# Patient Record
Sex: MALE | Race: WHITE | NOT HISPANIC OR LATINO
[De-identification: names, ages, dates, MRNs, and addresses within clinical notes are randomized per-mention and may not be internally consistent; named-entity substitution may affect disease eponyms.]

---

## 2021-06-14 ENCOUNTER — TRANSCRIPTION ENCOUNTER (OUTPATIENT)
Age: 86
End: 2021-06-14

## 2021-06-14 DIAGNOSIS — E03.9 HYPOTHYROIDISM, UNSPECIFIED: ICD-10-CM

## 2021-06-14 DIAGNOSIS — T82.7XXA INFECTION AND INFLAMMATORY REACTION DUE TO OTHER CARDIAC AND VASCULAR DEVICES, IMPLANTS AND GRAFTS, INITIAL ENCOUNTER: ICD-10-CM

## 2021-06-14 PROBLEM — Z00.00 ENCOUNTER FOR PREVENTIVE HEALTH EXAMINATION: Status: ACTIVE | Noted: 2021-06-14

## 2021-06-14 RX ORDER — LEVOFLOXACIN 750 MG/1
750 TABLET, FILM COATED ORAL
Refills: 0 | Status: ACTIVE | COMMUNITY

## 2021-06-14 RX ORDER — CEPHALEXIN 500 MG/1
500 CAPSULE ORAL 3 TIMES DAILY
Qty: 30 | Refills: 0 | Status: ACTIVE | COMMUNITY

## 2021-06-14 RX ORDER — BACILLUS COAGULANS/INULIN 1B-250 MG
CAPSULE ORAL
Refills: 0 | Status: ACTIVE | COMMUNITY

## 2021-06-14 RX ORDER — VIT C/E/ZN/COPPR/LUTEIN/ZEAXAN 250MG-90MG
CAPSULE ORAL
Refills: 0 | Status: ACTIVE | COMMUNITY

## 2021-06-14 RX ORDER — GABAPENTIN 100 MG/1
100 CAPSULE ORAL TWICE DAILY
Qty: 60 | Refills: 0 | Status: ACTIVE | COMMUNITY

## 2021-06-14 RX ORDER — LORATADINE 10 MG
17 TABLET,DISINTEGRATING ORAL
Refills: 0 | Status: ACTIVE | COMMUNITY

## 2021-06-14 RX ORDER — IPRATROPIUM BROMIDE 21 UG/1
0.03 SPRAY NASAL
Refills: 0 | Status: ACTIVE | COMMUNITY

## 2021-06-14 RX ORDER — FAMOTIDINE 40 MG/1
40 TABLET, FILM COATED ORAL DAILY
Refills: 0 | Status: ACTIVE | COMMUNITY

## 2021-06-14 RX ORDER — CRANBERRY FRUIT EXTRACT 200 MG
CAPSULE ORAL
Refills: 0 | Status: ACTIVE | COMMUNITY

## 2021-06-14 RX ORDER — BUDESONIDE 32 UG/1
32 SPRAY, METERED NASAL
Refills: 0 | Status: ACTIVE | COMMUNITY

## 2021-06-14 RX ORDER — ALBUTEROL 90 MCG
90 AEROSOL (GRAM) INHALATION
Refills: 0 | Status: ACTIVE | COMMUNITY

## 2021-06-14 RX ORDER — TEMAZEPAM 7.5 MG/1
7.5 CAPSULE ORAL
Refills: 0 | Status: ACTIVE | COMMUNITY

## 2021-06-14 RX ORDER — POLYETHYLENE GLYCOL 400 AND PROPYLENE GLYCOL 4; 3 MG/ML; MG/ML
0.4-0.3 SOLUTION/ DROPS OPHTHALMIC
Refills: 0 | Status: ACTIVE | COMMUNITY

## 2021-06-14 RX ORDER — TAMSULOSIN HYDROCHLORIDE 0.4 MG/1
0.4 CAPSULE ORAL
Refills: 0 | Status: ACTIVE | COMMUNITY

## 2021-06-14 RX ORDER — LEVOTHYROXINE SODIUM 0.05 MG/1
50 TABLET ORAL DAILY
Refills: 0 | Status: ACTIVE | COMMUNITY

## 2021-06-14 RX ORDER — RIFAMPIN 300 MG/1
300 CAPSULE ORAL
Refills: 0 | Status: ACTIVE | COMMUNITY

## 2021-06-14 RX ORDER — TORSEMIDE 20 MG/1
20 TABLET ORAL TWICE DAILY
Refills: 0 | Status: ACTIVE | COMMUNITY

## 2021-06-15 ENCOUNTER — INPATIENT (INPATIENT)
Facility: HOSPITAL | Age: 86
LOS: 1 days | Discharge: HOME | DRG: 229 | End: 2021-06-17
Attending: INTERNAL MEDICINE | Admitting: INTERNAL MEDICINE
Payer: MEDICARE

## 2021-06-15 ENCOUNTER — TRANSCRIPTION ENCOUNTER (OUTPATIENT)
Age: 86
End: 2021-06-15

## 2021-06-15 VITALS
RESPIRATION RATE: 17 BRPM | HEIGHT: 71 IN | DIASTOLIC BLOOD PRESSURE: 83 MMHG | HEART RATE: 80 BPM | WEIGHT: 162.04 LBS | SYSTOLIC BLOOD PRESSURE: 130 MMHG | OXYGEN SATURATION: 98 % | TEMPERATURE: 97 F

## 2021-06-15 DIAGNOSIS — Z95.0 PRESENCE OF CARDIAC PACEMAKER: Chronic | ICD-10-CM

## 2021-06-15 DIAGNOSIS — T82.7XXA INFECTION AND INFLAMMATORY REACTION DUE TO OTHER CARDIAC AND VASCULAR DEVICES, IMPLANTS AND GRAFTS, INITIAL ENCOUNTER: ICD-10-CM

## 2021-06-15 DIAGNOSIS — Z98.890 OTHER SPECIFIED POSTPROCEDURAL STATES: Chronic | ICD-10-CM

## 2021-06-15 LAB
ALBUMIN SERPL ELPH-MCNC: 3.5 G/DL — SIGNIFICANT CHANGE UP (ref 3.3–5)
ALP SERPL-CCNC: 130 U/L — HIGH (ref 40–120)
ALT FLD-CCNC: 17 U/L — SIGNIFICANT CHANGE UP (ref 10–45)
ANION GAP SERPL CALC-SCNC: 16 MMOL/L — SIGNIFICANT CHANGE UP (ref 5–17)
APTT BLD: 29.6 SEC — SIGNIFICANT CHANGE UP (ref 27.5–35.5)
AST SERPL-CCNC: 28 U/L — SIGNIFICANT CHANGE UP (ref 10–40)
BILIRUB SERPL-MCNC: 1.4 MG/DL — HIGH (ref 0.2–1.2)
BLD GP AB SCN SERPL QL: NEGATIVE — SIGNIFICANT CHANGE UP
BUN SERPL-MCNC: 27 MG/DL — HIGH (ref 7–23)
CALCIUM SERPL-MCNC: 9.1 MG/DL — SIGNIFICANT CHANGE UP (ref 8.4–10.5)
CHLORIDE SERPL-SCNC: 96 MMOL/L — SIGNIFICANT CHANGE UP (ref 96–108)
CO2 SERPL-SCNC: 28 MMOL/L — SIGNIFICANT CHANGE UP (ref 22–31)
CREAT SERPL-MCNC: 1.42 MG/DL — HIGH (ref 0.5–1.3)
GLUCOSE SERPL-MCNC: 104 MG/DL — HIGH (ref 70–99)
HCT VFR BLD CALC: 38.6 % — LOW (ref 39–50)
HGB BLD-MCNC: 11.9 G/DL — LOW (ref 13–17)
INR BLD: 1.2 RATIO — HIGH (ref 0.88–1.16)
MCHC RBC-ENTMCNC: 27.8 PG — SIGNIFICANT CHANGE UP (ref 27–34)
MCHC RBC-ENTMCNC: 30.8 GM/DL — LOW (ref 32–36)
MCV RBC AUTO: 90.2 FL — SIGNIFICANT CHANGE UP (ref 80–100)
NRBC # BLD: 0 /100 WBCS — SIGNIFICANT CHANGE UP (ref 0–0)
PLATELET # BLD AUTO: 232 K/UL — SIGNIFICANT CHANGE UP (ref 150–400)
POTASSIUM SERPL-MCNC: 3.6 MMOL/L — SIGNIFICANT CHANGE UP (ref 3.5–5.3)
POTASSIUM SERPL-SCNC: 3.6 MMOL/L — SIGNIFICANT CHANGE UP (ref 3.5–5.3)
PROT SERPL-MCNC: 7.1 G/DL — SIGNIFICANT CHANGE UP (ref 6–8.3)
PROTHROM AB SERPL-ACNC: 14.3 SEC — HIGH (ref 10.6–13.6)
RBC # BLD: 4.28 M/UL — SIGNIFICANT CHANGE UP (ref 4.2–5.8)
RBC # FLD: 17.2 % — HIGH (ref 10.3–14.5)
RH IG SCN BLD-IMP: POSITIVE — SIGNIFICANT CHANGE UP
SODIUM SERPL-SCNC: 140 MMOL/L — SIGNIFICANT CHANGE UP (ref 135–145)
WBC # BLD: 7.17 K/UL — SIGNIFICANT CHANGE UP (ref 3.8–10.5)
WBC # FLD AUTO: 7.17 K/UL — SIGNIFICANT CHANGE UP (ref 3.8–10.5)

## 2021-06-15 PROCEDURE — 93010 ELECTROCARDIOGRAM REPORT: CPT | Mod: 76

## 2021-06-15 PROCEDURE — 33235 REMOVAL PACEMAKER ELECTRODE: CPT

## 2021-06-15 PROCEDURE — 33274 TCAT INSJ/RPL PERM LDLS PM: CPT | Mod: Q0

## 2021-06-15 PROCEDURE — 33233 REMOVAL OF PM GENERATOR: CPT

## 2021-06-15 RX ORDER — OXYCODONE AND ACETAMINOPHEN 5; 325 MG/1; MG/1
1 TABLET ORAL EVERY 6 HOURS
Refills: 0 | Status: DISCONTINUED | OUTPATIENT
Start: 2021-06-15 | End: 2021-06-17

## 2021-06-15 RX ORDER — GABAPENTIN 400 MG/1
2 CAPSULE ORAL
Qty: 0 | Refills: 0 | DISCHARGE

## 2021-06-15 RX ORDER — BUDESONIDE, MICRONIZED 100 %
1 POWDER (GRAM) MISCELLANEOUS
Qty: 0 | Refills: 0 | DISCHARGE

## 2021-06-15 RX ORDER — FAMOTIDINE 10 MG/ML
1 INJECTION INTRAVENOUS
Qty: 0 | Refills: 0 | DISCHARGE

## 2021-06-15 RX ORDER — LANOLIN ALCOHOL/MO/W.PET/CERES
3 CREAM (GRAM) TOPICAL AT BEDTIME
Refills: 0 | Status: DISCONTINUED | OUTPATIENT
Start: 2021-06-15 | End: 2021-06-17

## 2021-06-15 RX ORDER — FENTANYL CITRATE 50 UG/ML
12.5 INJECTION INTRAVENOUS
Refills: 0 | Status: DISCONTINUED | OUTPATIENT
Start: 2021-06-15 | End: 2021-06-15

## 2021-06-15 RX ORDER — SODIUM CHLORIDE 9 MG/ML
1000 INJECTION, SOLUTION INTRAVENOUS
Refills: 0 | Status: DISCONTINUED | OUTPATIENT
Start: 2021-06-15 | End: 2021-06-15

## 2021-06-15 RX ORDER — ACETAMINOPHEN 500 MG
650 TABLET ORAL EVERY 6 HOURS
Refills: 0 | Status: DISCONTINUED | OUTPATIENT
Start: 2021-06-15 | End: 2021-06-17

## 2021-06-15 RX ORDER — ALBUTEROL 90 UG/1
1 AEROSOL, METERED ORAL
Qty: 0 | Refills: 0 | DISCHARGE

## 2021-06-15 RX ORDER — CEFEPIME 1 G/1
1000 INJECTION, POWDER, FOR SOLUTION INTRAMUSCULAR; INTRAVENOUS ONCE
Refills: 0 | Status: COMPLETED | OUTPATIENT
Start: 2021-06-15 | End: 2021-06-15

## 2021-06-15 RX ORDER — LEVOTHYROXINE SODIUM 125 MCG
1 TABLET ORAL
Qty: 0 | Refills: 0 | DISCHARGE

## 2021-06-15 RX ORDER — SODIUM CHLORIDE 9 MG/ML
3 INJECTION INTRAMUSCULAR; INTRAVENOUS; SUBCUTANEOUS EVERY 8 HOURS
Refills: 0 | Status: DISCONTINUED | OUTPATIENT
Start: 2021-06-15 | End: 2021-06-17

## 2021-06-15 RX ORDER — VANCOMYCIN HCL 1 G
1000 VIAL (EA) INTRAVENOUS EVERY 24 HOURS
Refills: 0 | Status: DISCONTINUED | OUTPATIENT
Start: 2021-06-16 | End: 2021-06-16

## 2021-06-15 RX ORDER — ACETAMINOPHEN 500 MG
1000 TABLET ORAL ONCE
Refills: 0 | Status: COMPLETED | OUTPATIENT
Start: 2021-06-15 | End: 2021-06-15

## 2021-06-15 RX ORDER — TAMSULOSIN HYDROCHLORIDE 0.4 MG/1
1 CAPSULE ORAL
Qty: 0 | Refills: 0 | DISCHARGE

## 2021-06-15 RX ORDER — TEMAZEPAM 15 MG/1
1 CAPSULE ORAL
Qty: 0 | Refills: 0 | DISCHARGE

## 2021-06-15 RX ORDER — CEFEPIME 1 G/1
1000 INJECTION, POWDER, FOR SOLUTION INTRAMUSCULAR; INTRAVENOUS EVERY 12 HOURS
Refills: 0 | Status: DISCONTINUED | OUTPATIENT
Start: 2021-06-16 | End: 2021-06-16

## 2021-06-15 RX ORDER — FENTANYL CITRATE 50 UG/ML
25 INJECTION INTRAVENOUS
Refills: 0 | Status: DISCONTINUED | OUTPATIENT
Start: 2021-06-15 | End: 2021-06-15

## 2021-06-15 RX ADMIN — Medication 1000 MILLIGRAM(S): at 19:50

## 2021-06-15 RX ADMIN — FENTANYL CITRATE 25 MICROGRAM(S): 50 INJECTION INTRAVENOUS at 19:54

## 2021-06-15 RX ADMIN — Medication 650 MILLIGRAM(S): at 23:55

## 2021-06-15 RX ADMIN — SODIUM CHLORIDE 75 MILLILITER(S): 9 INJECTION, SOLUTION INTRAVENOUS at 20:02

## 2021-06-15 RX ADMIN — SODIUM CHLORIDE 3 MILLILITER(S): 9 INJECTION INTRAMUSCULAR; INTRAVENOUS; SUBCUTANEOUS at 14:44

## 2021-06-15 RX ADMIN — CEFEPIME 100 MILLIGRAM(S): 1 INJECTION, POWDER, FOR SOLUTION INTRAMUSCULAR; INTRAVENOUS at 23:29

## 2021-06-15 RX ADMIN — Medication 400 MILLIGRAM(S): at 19:20

## 2021-06-15 RX ADMIN — FENTANYL CITRATE 25 MICROGRAM(S): 50 INJECTION INTRAVENOUS at 20:24

## 2021-06-15 RX ADMIN — Medication 650 MILLIGRAM(S): at 23:30

## 2021-06-15 RX ADMIN — Medication 3 MILLIGRAM(S): at 23:29

## 2021-06-15 NOTE — PRE-ANESTHESIA EVALUATION ADULT - NSANTHADDINFOFT_GEN_ALL_CORE
Discussion w/ Dr. De Jesus re: anesthetic plan. Leads are ~ 5 yrs old and he expects them to be easily removable. Will hold off on invasive monitoring (chelsea + BEVERLY) unless there is a need for laser extraction and/or GA.

## 2021-06-15 NOTE — H&P CARDIOLOGY - PMH
Atrial fibrillation    Congestive heart failure    COPD (chronic obstructive pulmonary disease)    History of fall    Hypothyroidism    Pacemaker     Atrial fibrillation    Congestive heart failure    COPD (chronic obstructive pulmonary disease)    History of fall    [place] (hard of hearing)    Hypothyroidism    Pacemaker

## 2021-06-15 NOTE — H&P CARDIOLOGY - FAMILY HISTORY
Sibling  Still living? No  Myocardial infarction, Age at diagnosis: Age Unknown     Father  Still living? No  Family history of cancer, Age at diagnosis: Age Unknown

## 2021-06-15 NOTE — H&P CARDIOLOGY - HISTORY OF PRESENT ILLNESS
89 yo M with PMHx of Afib, COPD, CHF, hypothyroidism, PPM (2005), PPM Gen change 3 weeks ago presents for lead extraction and micra PPM implant. Pt's son reports he had infection on PPM site after generator changed 3 weeks ago, revision and debridements done treated Cephalexin, Rifamion, and Levaquin and referred to Dr. De Jesus for further evaluation and treatment; lead extraction and insertion of micraPPM.  Pt states that he feels mild discomfort on PPM site.     Pt's son Gregory , Bill [phone], Spouse Trisha   Card: Dr. Constantine Mercado    89 yo M with PMHx of Afib, COPD, CHF, hypothyroidism, PPM (2005), PPM Gen change 3 weeks ago presents for lead extraction and micra PPM implant. Pt's son reports he had infection on PPM site after generator changed 3 weeks ago, revision and debridements done treated Cephalexin, Rifamion, and Levaquin and referred to Dr. De Jesus for further evaluation and treatment; lead extraction and insertion of micraPPM.  Pt states that he feels mild discomfort on PPM site.     Pt's son Gregory , Bill [phone], Spouse Trisha   Card: Dr. Constantine Mercado [phone]  Dr. Oglesby    89 yo M with PMHx of Afib (was on Eliquis 2.5mg bid, last dose about a month ago), COPD, CHF, hypothyroidism, PPM (2005), PPM Gen change 3 weeks ago presents for lead extraction and micra PPM implant. Pt reports he had oozing from the left CW site with intermittent sharp pain, found infection on PPM site received ABX with Cephalexin, Rifampin, and Levaquin and referred to Dr. De Jesus for further evaluation and treatment; lead extraction and insertion of micraPPM.  Pt states that he feels mild discomfort on PPM site.     Pt's son Gregory , Bill [phone], Spouse Trisha   Card: Dr. Constantine Mercado [phone]  Dr. Oglesby

## 2021-06-15 NOTE — H&P CARDIOLOGY - ATTENDING COMMENTS
seen and agree  PCM pocket infection post gen change. 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

## 2021-06-15 NOTE — PRE-ANESTHESIA EVALUATION ADULT - NSANTHPMHFT_GEN_ALL_CORE
89 yo M w/ PMHx of Afib, COPD, CHF, hypothyroidism, PPM (2015) s/p PPM Gen change 3 weeks ago w/ pocket infection. Presents for lead extraction and micra PPM implant.     Denies active CP/SOB/Orthopnea  Denies hx of MI/CHF

## 2021-06-16 DIAGNOSIS — Z95.0 PRESENCE OF CARDIAC PACEMAKER: ICD-10-CM

## 2021-06-16 DIAGNOSIS — T82.7XXA INFECTION AND INFLAMMATORY REACTION DUE TO OTHER CARDIAC AND VASCULAR DEVICES, IMPLANTS AND GRAFTS, INITIAL ENCOUNTER: ICD-10-CM

## 2021-06-16 DIAGNOSIS — I48.91 UNSPECIFIED ATRIAL FIBRILLATION: ICD-10-CM

## 2021-06-16 LAB
ANION GAP SERPL CALC-SCNC: 15 MMOL/L — SIGNIFICANT CHANGE UP (ref 5–17)
BUN SERPL-MCNC: 26 MG/DL — HIGH (ref 7–23)
CALCIUM SERPL-MCNC: 8.6 MG/DL — SIGNIFICANT CHANGE UP (ref 8.4–10.5)
CHLORIDE SERPL-SCNC: 97 MMOL/L — SIGNIFICANT CHANGE UP (ref 96–108)
CO2 SERPL-SCNC: 27 MMOL/L — SIGNIFICANT CHANGE UP (ref 22–31)
CREAT SERPL-MCNC: 1.45 MG/DL — HIGH (ref 0.5–1.3)
GLUCOSE SERPL-MCNC: 110 MG/DL — HIGH (ref 70–99)
GRAM STN FLD: SIGNIFICANT CHANGE UP
HCT VFR BLD CALC: 33.9 % — LOW (ref 39–50)
HGB BLD-MCNC: 10.8 G/DL — LOW (ref 13–17)
MCHC RBC-ENTMCNC: 28.4 PG — SIGNIFICANT CHANGE UP (ref 27–34)
MCHC RBC-ENTMCNC: 31.9 GM/DL — LOW (ref 32–36)
MCV RBC AUTO: 89.2 FL — SIGNIFICANT CHANGE UP (ref 80–100)
NIGHT BLUE STAIN TISS: SIGNIFICANT CHANGE UP
NRBC # BLD: 0 /100 WBCS — SIGNIFICANT CHANGE UP (ref 0–0)
PLATELET # BLD AUTO: 207 K/UL — SIGNIFICANT CHANGE UP (ref 150–400)
POTASSIUM SERPL-MCNC: 3.4 MMOL/L — LOW (ref 3.5–5.3)
POTASSIUM SERPL-SCNC: 3.4 MMOL/L — LOW (ref 3.5–5.3)
RBC # BLD: 3.8 M/UL — LOW (ref 4.2–5.8)
RBC # FLD: 16.9 % — HIGH (ref 10.3–14.5)
SODIUM SERPL-SCNC: 139 MMOL/L — SIGNIFICANT CHANGE UP (ref 135–145)
SPECIMEN SOURCE: SIGNIFICANT CHANGE UP
SPECIMEN SOURCE: SIGNIFICANT CHANGE UP
WBC # BLD: 17.1 K/UL — HIGH (ref 3.8–10.5)
WBC # FLD AUTO: 17.1 K/UL — HIGH (ref 3.8–10.5)

## 2021-06-16 PROCEDURE — 93010 ELECTROCARDIOGRAM REPORT: CPT | Mod: 76

## 2021-06-16 PROCEDURE — 99223 1ST HOSP IP/OBS HIGH 75: CPT

## 2021-06-16 PROCEDURE — 71046 X-RAY EXAM CHEST 2 VIEWS: CPT | Mod: 26

## 2021-06-16 PROCEDURE — 99232 SBSQ HOSP IP/OBS MODERATE 35: CPT

## 2021-06-16 RX ORDER — BUDESONIDE, MICRONIZED 100 %
0.25 POWDER (GRAM) MISCELLANEOUS
Refills: 0 | Status: DISCONTINUED | OUTPATIENT
Start: 2021-06-16 | End: 2021-06-17

## 2021-06-16 RX ORDER — FAMOTIDINE 10 MG/ML
20 INJECTION INTRAVENOUS
Refills: 0 | Status: DISCONTINUED | OUTPATIENT
Start: 2021-06-16 | End: 2021-06-17

## 2021-06-16 RX ORDER — LEVOTHYROXINE SODIUM 125 MCG
50 TABLET ORAL DAILY
Refills: 0 | Status: DISCONTINUED | OUTPATIENT
Start: 2021-06-16 | End: 2021-06-17

## 2021-06-16 RX ORDER — POTASSIUM BICARBONATE 978 MG/1
25 TABLET, EFFERVESCENT ORAL ONCE
Refills: 0 | Status: COMPLETED | OUTPATIENT
Start: 2021-06-16 | End: 2021-06-16

## 2021-06-16 RX ORDER — FAMOTIDINE 10 MG/ML
20 INJECTION INTRAVENOUS DAILY
Refills: 0 | Status: DISCONTINUED | OUTPATIENT
Start: 2021-06-16 | End: 2021-06-16

## 2021-06-16 RX ORDER — GABAPENTIN 400 MG/1
200 CAPSULE ORAL AT BEDTIME
Refills: 0 | Status: DISCONTINUED | OUTPATIENT
Start: 2021-06-16 | End: 2021-06-17

## 2021-06-16 RX ORDER — SENNA PLUS 8.6 MG/1
2 TABLET ORAL AT BEDTIME
Refills: 0 | Status: DISCONTINUED | OUTPATIENT
Start: 2021-06-16 | End: 2021-06-17

## 2021-06-16 RX ORDER — ALBUTEROL 90 UG/1
1 AEROSOL, METERED ORAL
Refills: 0 | Status: DISCONTINUED | OUTPATIENT
Start: 2021-06-16 | End: 2021-06-17

## 2021-06-16 RX ORDER — TAMSULOSIN HYDROCHLORIDE 0.4 MG/1
0.4 CAPSULE ORAL AT BEDTIME
Refills: 0 | Status: DISCONTINUED | OUTPATIENT
Start: 2021-06-16 | End: 2021-06-17

## 2021-06-16 RX ORDER — CIPROFLOXACIN LACTATE 400MG/40ML
250 VIAL (ML) INTRAVENOUS EVERY 12 HOURS
Refills: 0 | Status: DISCONTINUED | OUTPATIENT
Start: 2021-06-16 | End: 2021-06-17

## 2021-06-16 RX ADMIN — Medication 20 MILLIGRAM(S): at 17:27

## 2021-06-16 RX ADMIN — ALBUTEROL 1 PUFF(S): 90 AEROSOL, METERED ORAL at 18:49

## 2021-06-16 RX ADMIN — GABAPENTIN 200 MILLIGRAM(S): 400 CAPSULE ORAL at 21:15

## 2021-06-16 RX ADMIN — TAMSULOSIN HYDROCHLORIDE 0.4 MILLIGRAM(S): 0.4 CAPSULE ORAL at 21:15

## 2021-06-16 RX ADMIN — Medication 650 MILLIGRAM(S): at 10:23

## 2021-06-16 RX ADMIN — Medication 650 MILLIGRAM(S): at 15:30

## 2021-06-16 RX ADMIN — FAMOTIDINE 20 MILLIGRAM(S): 10 INJECTION INTRAVENOUS at 14:45

## 2021-06-16 RX ADMIN — SODIUM CHLORIDE 3 MILLILITER(S): 9 INJECTION INTRAMUSCULAR; INTRAVENOUS; SUBCUTANEOUS at 21:01

## 2021-06-16 RX ADMIN — SENNA PLUS 2 TABLET(S): 8.6 TABLET ORAL at 19:19

## 2021-06-16 RX ADMIN — SODIUM CHLORIDE 3 MILLILITER(S): 9 INJECTION INTRAMUSCULAR; INTRAVENOUS; SUBCUTANEOUS at 06:29

## 2021-06-16 RX ADMIN — Medication 1 DROP(S): at 18:51

## 2021-06-16 RX ADMIN — SODIUM CHLORIDE 3 MILLILITER(S): 9 INJECTION INTRAMUSCULAR; INTRAVENOUS; SUBCUTANEOUS at 14:01

## 2021-06-16 RX ADMIN — Medication 650 MILLIGRAM(S): at 16:00

## 2021-06-16 RX ADMIN — Medication 250 MILLIGRAM(S): at 17:27

## 2021-06-16 RX ADMIN — POTASSIUM BICARBONATE 25 MILLIEQUIVALENT(S): 978 TABLET, EFFERVESCENT ORAL at 16:22

## 2021-06-16 RX ADMIN — Medication 250 MILLIGRAM(S): at 00:04

## 2021-06-16 RX ADMIN — Medication 650 MILLIGRAM(S): at 09:53

## 2021-06-16 NOTE — DISCHARGE NOTE PROVIDER - PROVIDER TOKENS
FREE:[LAST:[Rogelio],FIRST:[Constantine],PHONE:[phone]:[(   )    -]] FREE:[LAST:[Rogelio],FIRST:[Constantine],PHONE:[phone]],FAX:[(   )    -],FOLLOWUP:[1 week],ESTABLISHEDPATIENT:[T]]

## 2021-06-16 NOTE — CHART NOTE - NSCHARTNOTEFT_GEN_A_CORE
89 yo M with PMHx of Afib (was on Eliquis 2.5mg bid, last dose about a month ago), COPD, CHF, hypothyroidism, PPM (2005), PPM Gen change 3 weeks ago presents for lead extraction and micra PPM implant. Pt reports he had oozing from the left CW site with intermittent sharp pain, found infection on PPM site received ABX with Cephalexin, Rifampin, and Levaquin and referred to Dr. De Jesus for further evaluation and treatment; lead extraction and insertion of micraPPM.  Pt states that he feels mild discomfort on PPM site.     Patient is now s/p PPM lead and device extraction with Micra PM implant via RFV access.  Right groin suture removed @ 0700 without incident.  Site is benign without presence of bleeding/swelling/hematoma/s/s of infection.  Right pedal pulses palpable.    Awilda Moreno ANP-C  [phone]
BRIEF PROCEDURE NOTE    GRAYSON BEASLEY  [id number]    Pre-op Diagnosis: Infected BiV-P    Post-op Diagnosis: Same    Procedure: PPM/Lead extraction, Micra Implant    Electrophysiologist: Tasha De Jesus MD    Assistant: Johnnie Christianson MD    Anesthesia: GA    Access: left axillary vein    Description:  6Fr sheath RFV using Seldinger technique  Upgrade to Micra sheath, Micra delivered successfully.    Local with 0.5% Marcain  PPM pocket opened/PPM removed  Leads freed from scar in pocket, anchors removed  Active fixation retracted, leads cut  Locking stylets placed distally and #5 silk tied to outside of lead    Leads removed with 14 Fr laser sheath    Wound debridment performed. Wound vac placed    Complications: none    EBL: 200cc    Disposition: to recovery/stable    Plan:  Stat CXR  ECG  ID consult  Wound care consult  NO HEPARIN/LOVINOX

## 2021-06-16 NOTE — PROGRESS NOTE ADULT - SUBJECTIVE AND OBJECTIVE BOX
HPI:  89 yo M with PMHx of Afib (was on Eliquis 2.5mg bid, last dose about a month ago), COPD, CHF, hypothyroidism, PPM (), PPM Gen change 3 weeks ago presents for lead extraction and micra PPM implant. Pt reports he had oozing from the left CW site with intermittent sharp pain, found infection on PPM site received ABX with Cephalexin, Rifampin, and Levaquin and referred to Dr. De Jesus for further evaluation and treatment; lead extraction and insertion of micraPPM.  Pt states that he feels mild discomfort on PPM site.     Pt's son Gregory , Bill [phone], Spouse Trisha   Card: Dr. Constantine Mercado [phone]  Dr. Oglesby    (15 Preston 2021 11:13)    Allergies    No Known Allergies    Intolerances        Medications:  acetaminophen   Tablet .. 650 milliGRAM(s) Oral every 6 hours PRN  cefepime   IVPB 1000 milliGRAM(s) IV Intermittent every 12 hours  melatonin 3 milliGRAM(s) Oral at bedtime PRN  oxycodone    5 mG/acetaminophen 325 mG 1 Tablet(s) Oral every 6 hours PRN  sodium chloride 0.9% lock flush 3 milliLiter(s) IV Push every 8 hours  vancomycin  IVPB 1000 milliGRAM(s) IV Intermittent every 24 hours      Vitals:  T(C): 36.8 (06-15-21 @ 22:55), Max: 36.8 (06-15-21 @ 22:55)  HR: 80 (06-15-21 @ 23:50) (80 - 80)  BP: 106/65 (06-15-21 @ 23:50) (106/65 - 173/98)  BP(mean): 79 (06-15-21 @ 23:50) (79 - 126)  RR: 16 (06-15-21 @ 23:50) (16 - 18)  SpO2: 97% (06-15-21 @ 23:50) (94% - 100%)  Wt(kg): --  Daily Height in cm: 180.34 (15 Preston 2021 16:30)    Daily Weight in k.5 (15 Preston 2021 11:13)  I&O's Summary    15 Preston 2021 07:01  -  2021 01:36  --------------------------------------------------------  IN: 465 mL / OUT: 850 mL / NET: -385 mL          Physical Exam:  Appearance: Normal  Eyes: PERRL, EOMI  HENT: Normal oral mus[place]a, NC/AT  Cardiovascular: S1S2, RRR, No M/R/G, no JVD, No Lower extremity edema  Procedural Access Site: No hematoma, Non-tender to palpation, 2+ pulse, No bruit, No Ecchymosis  Respiratory: Clear to auscultation bilaterally  Gastrointestinal: Soft, Non tender, Normal Bowel Sounds  Musculoskeletal: No clubbing, No joint deformity   Neurologic: Non-focal  Lymphatic: No lymphadenopathy  Psychiatry: AAOx3, Mood & affect appropriate  Skin: No rashes, No ecchymoses, No cyanosis    06-15    140  |  96  |  27<H>  ----------------------------<  104<H>  3.6   |  28  |  1.42<H>    Ca    9.1      15 Preston 2021 11:44    TPro  7.1  /  Alb  3.5  /  TBili  1.4<H>  /  DBili  x   /  AST  28  /  ALT  17  /  AlkPhos  130<H>  06-15    PT/INR - ( 15 Preston 2021 11:44 )   PT: 14.3 sec;   INR: 1.20 ratio         PTT - ( 15 Preston 2021 11:44 )  PTT:29.6 sec        Lipid panel   Hgb A1c                         11.9   7.17  )-----------( 232      ( 15 Preston 2021 11:44 )             38.6

## 2021-06-16 NOTE — DISCHARGE NOTE PROVIDER - HOSPITAL COURSE
HPI:  89 yo M with PMHx of Afib (was on Eliquis 2.5mg bid, last dose about a month ago), COPD, CHF, hypothyroidism, PPM (2005), PPM Gen change 3 weeks ago presents for lead extraction and micra PPM implant. Pt reports he had oozing from the left CW site with intermittent sharp pain, found infection on PPM site received ABX with Cephalexin, Rifampin, and Levaquin and referred to Dr. De Jesus for further evaluation and treatment; lead extraction and insertion of micraPPM.  Pt states that he feels mild discomfort on PPM site.     Pt's son Gregory , Bill [phone], Spouse Trisha   Card: Dr. Constantine Mercado [phone]  Dr. Oglesby    (15 Preston 2021 11:13)    6/15 s/p PPM extraction, lead extraction and Micra leadless PPM placement. Wound vacuum to left chest wall site, suture in right femoral access site, no bleeding noted.

## 2021-06-16 NOTE — DISCHARGE NOTE PROVIDER - NSDCCPCAREPLAN_GEN_ALL_CORE_FT
PRINCIPAL DISCHARGE DIAGNOSIS  Diagnosis: Infection of pacemaker lead wire  Assessment and Plan of Treatment: Continue with follow-up visits to your electrophysiology team and with your home remote device monitoring (if applicable). Continue your medications as prescribed. Monitor your left chest wall site for swelling, bleeding.       PRINCIPAL DISCHARGE DIAGNOSIS  Diagnosis: Infection of pacemaker lead wire  Assessment and Plan of Treatment: Continue with follow-up visits to your electrophysiology team and with your home remote device monitoring (if applicable). Continue your medications as prescribed. Monitor your left chest wall site for swelling, bleeding.      SECONDARY DISCHARGE DIAGNOSES  Diagnosis: Atrial fibrillation  Assessment and Plan of Treatment: Continue with your cardiologist and primary care MD. Continue your current medications. Call your physician for palpitations, feelings of rapid heart beat, lightheadedness, or dizziness.     PRINCIPAL DISCHARGE DIAGNOSIS  Diagnosis: Infection of pacemaker lead wire  Assessment and Plan of Treatment: Continue with follow-up visits to your electrophysiology team and with your home remote device monitoring (if applicable). Continue your medications as prescribed. Monitor your left chest wall site for swelling, bleeding.      SECONDARY DISCHARGE DIAGNOSES  Diagnosis: Atrial fibrillation  Assessment and Plan of Treatment: Continue with your cardiologist and primary care MD. Continue your current medications. Call your physician for palpitations, feelings of rapid heart beat, lightheadedness, or dizziness.  take your anticoagulation as prescribed  monitor for signs and symptoms of bleeding

## 2021-06-16 NOTE — DISCHARGE NOTE PROVIDER - NSDCMRMEDTOKEN_GEN_ALL_CORE_FT
Albuterol (Eqv-Proventil HFA) 90 mcg/inh inhalation aerosol: 1 puff(s) inhaled 2 times a day  budesonide 90 mcg/inh inhalation powder: 1 puff(s) inhaled 2 times a day  cephalexin 500 mg oral tablet: 1 tab(s) orally every 8 hours  started on 6/7/21  Eliquis 2.5 mg oral tablet: 1 tab(s) orally 2 times a day  famotidine 40 mg oral tablet: 1 tab(s) orally once a day (at bedtime)  Flomax 0.4 mg oral capsule: 1 cap(s) orally once a day (at bedtime)  gabapentin 100 mg oral tablet: 2 tab(s) orally once a day (at bedtime)  Levaquin 750 mg oral tablet: 1 tab(s) orally every 48 hours  started on 6/9/21  levothyroxine 50 mcg (0.05 mg) oral tablet: 1 tab(s) orally once a day  rifAMPin 300 mg oral capsule: 1 cap(s) orally 2 times a day  started 6/9/21  Systane ophthalmic solution: 1 drop(s) to each affected eye 4 times a day, As Needed  temazepam 7.5 mg oral capsule: 1 cap(s) orally once a day (at bedtime), As Needed  torsemide 20 mg oral tablet: 1 tab(s) orally 2 times a day   acetaminophen 325 mg oral tablet: 2 tab(s) orally every 6 hours, As needed, Mild Pain (1 - 3)  Albuterol (Eqv-Proventil HFA) 90 mcg/inh inhalation aerosol: 1 puff(s) inhaled 2 times a day  budesonide 90 mcg/inh inhalation powder: 1 puff(s) inhaled 2 times a day  cephalexin 500 mg oral tablet: 1 tab(s) orally every 8 hours  started on 6/7/21  Eliquis 2.5 mg oral tablet: 1 tab(s) orally 2 times a day  famotidine 40 mg oral tablet: 1 tab(s) orally once a day (at bedtime)  Flomax 0.4 mg oral capsule: 1 cap(s) orally once a day (at bedtime)  gabapentin 100 mg oral tablet: 2 tab(s) orally once a day (at bedtime)  Levaquin 750 mg oral tablet: 1 tab(s) orally every 48 hours  started on 6/9/21  levothyroxine 50 mcg (0.05 mg) oral tablet: 1 tab(s) orally once a day  rifAMPin 300 mg oral capsule: 1 cap(s) orally 2 times a day  started 6/9/21  Systane ophthalmic solution: 1 drop(s) to each affected eye 4 times a day, As Needed  temazepam 7.5 mg oral capsule: 1 cap(s) orally once a day (at bedtime), As Needed  torsemide 20 mg oral tablet: 1 tab(s) orally 2 times a day   acetaminophen 325 mg oral tablet: 2 tab(s) orally every 6 hours, As needed, Mild Pain (1 - 3)  Albuterol (Eqv-Proventil HFA) 90 mcg/inh inhalation aerosol: 1 puff(s) inhaled 2 times a day  budesonide 90 mcg/inh inhalation powder: 1 puff(s) inhaled 2 times a day  ciprofloxacin 250 mg oral tablet: 1 tab(s) orally every 12 hours  Eliquis 2.5 mg oral tablet: 1 tab(s) orally 2 times a day  famotidine 40 mg oral tablet: 1 tab(s) orally once a day (at bedtime)  Flomax 0.4 mg oral capsule: 1 cap(s) orally once a day (at bedtime)  gabapentin 100 mg oral tablet: 2 tab(s) orally once a day (at bedtime)  levothyroxine 50 mcg (0.05 mg) oral tablet: 1 tab(s) orally once a day  Systane ophthalmic solution: 1 drop(s) to each affected eye 4 times a day, As Needed  temazepam 7.5 mg oral capsule: 1 cap(s) orally once a day (at bedtime), As Needed  torsemide 20 mg oral tablet: 1 tab(s) orally 2 times a day   acetaminophen 325 mg oral tablet: 2 tab(s) orally every 6 hours, As needed, Mild Pain (1 - 3)  Albuterol (Eqv-Proventil HFA) 90 mcg/inh inhalation aerosol: 1 puff(s) inhaled 2 times a day  budesonide 90 mcg/inh inhalation powder: 1 puff(s) inhaled 2 times a day  ciprofloxacin 250 mg oral tablet: 1 tab(s) orally every 12 hours  Eliquis 2.5 mg oral tablet: 1 tab(s) orally 2 times a day  Resume 6/20/21  famotidine 40 mg oral tablet: 1 tab(s) orally once a day (at bedtime)  Flomax 0.4 mg oral capsule: 1 cap(s) orally once a day (at bedtime)  gabapentin 100 mg oral tablet: 2 tab(s) orally once a day (at bedtime)  levothyroxine 50 mcg (0.05 mg) oral tablet: 1 tab(s) orally once a day  Systane ophthalmic solution: 1 drop(s) to each affected eye 4 times a day, As Needed  temazepam 7.5 mg oral capsule: 1 cap(s) orally once a day (at bedtime), As Needed  torsemide 20 mg oral tablet: 1 tab(s) orally 2 times a day

## 2021-06-16 NOTE — CONSULT NOTE ADULT - ASSESSMENT
89 year old who developed a pocket infection after battery change   cultures from wd grew a pan sensitive pseudomonas   device has been extracted and he looks well  never had bacteremia per cardiology  discussed with Dr. De Jesus and Dr. Henriquez in [place]>      dc vanco and cefepime   ( vanco not needed and can put him in renal failure)    paln is to discharge on po cipro 250 bid  for 4 weeks to avoid picc.  only concern is the elevation of wbc, hopefully relate to procedure .

## 2021-06-16 NOTE — PROGRESS NOTE ADULT - ASSESSMENT
89 y/o male with above pmhx, s/p PPM extraction, PPM lead extraction and Micra leadless PPM insertion.

## 2021-06-16 NOTE — DISCHARGE NOTE PROVIDER - CARE PROVIDER_API CALL
Constantine Mercado  Phone: [phone]  Fax: (   )    -  Follow Up Time:    Constantine Mercado  Phone: [phone]  Fax: (   )    -  Established Patient  Follow Up Time: 1 week

## 2021-06-16 NOTE — CONSULT NOTE ADULT - SUBJECTIVE AND OBJECTIVE BOX
Patient is a 89y old  Male who presents with a chief complaint of Pacemaker lead infection (16 Jun 2021 01:25)    HPI:  89 yo M with PMHx of Afib (was on Eliquis 2.5mg bid, last dose about a month ago), COPD, CHF, hypothyroidism, PPM (2005), PPM Gen change 3 weeks ago presents for lead extraction and micra PPM implant. Pt reports he had oozing from the left CW site with intermittent sharp pain, found infection on PPM site received ABX with Cephalexin, Rifampin, and Levaquin and referred to Dr. De Jesus for further evaluation and treatment; lead extraction and insertion of micraPPM.  Pt states that he feels mild discomfort on PPM site.     Pt's son Gregory , Bill [phone], Spouse Trisha   Card: Dr. Constantine Mercado [phone]  Dr. Oglesby    (15 Preston 2021 11:13)      PAST MEDICAL & SURGICAL HISTORY:  Atrial fibrillation    COPD (chronic obstructive pulmonary disease)    Congestive heart failure    Hypothyroidism    Pacemaker    History of fall    [place] (hard of hearing)    Artificial cardiac pacemaker    History of hip surgery  left        Social history:    FAMILY HISTORY:  Myocardial infarction (Sibling)  lung    Family history of cancer (Father)             Antimicrobials:    cefepime   IVPB 1000 milliGRAM(s) IV Intermittent every 12 hours  vancomycin  IVPB 1000 milliGRAM(s) IV Intermittent every 24 hours        Vital Signs Last 24 Hrs  T(C): 37.1 (16 Jun 2021 09:24), Max: 37.1 (16 Jun 2021 09:24)  T(F): 98.7 (16 Jun 2021 09:24), Max: 98.7 (16 Jun 2021 09:24)  HR: 86 (16 Jun 2021 09:24) (80 - 86)  BP: 102/67 (16 Jun 2021 09:24) (95/65 - 173/98)  BP(mean): 75 (16 Jun 2021 04:09) (75 - 126)  RR: 17 (16 Jun 2021 09:24) (16 - 18)  SpO2: 96% (16 Jun 2021 09:24) (94% - 100%)    PHYSICAL EXAM:Pleasant patient in no acute distress.      Constitutional:Comfortable.Awake and alert  No cachexia     Eyes:PERRL EOMI.NO discharge or conjunctival injection    ENMT:No sinus tenderness.No thrush.No pharyngeal exudate or erythema.Fair dental hygiene    Neck:Supple,No LN,no JVD      Respiratory:Good air entry bilaterally,CTA    Cardiovascular:S1 S2 wnl, No murmurs,rub or gallops    Gastrointestinal:Soft BS(+) no tenderness no masses ,No rebound or guarding    Genitourinary:No CVA tendereness     Rectal:    Extremities:No cyanosis,clubbing or edema.                                   10.8   17.10 )-----------( 207      ( 16 Jun 2021 04:23 )             33.9         06-16    139  |  97  |  26<H>  ----------------------------<  110<H>  3.4<L>   |  27  |  1.45<H>    Ca    8.6      16 Jun 2021 04:23    TPro  7.1  /  Alb  3.5  /  TBili  1.4<H>  /  DBili  x   /  AST  28  /  ALT  17  /  AlkPhos  130<H>  06-15      RECENT CULTURES:  06-16 @ 00:45  .Tissue Other  --  --  --    Testing in progress  --      MICROBIOLOGY:  Culture Results:   Testing in progress (06-16 @ 00:45)          Radiology:      Assessment:        Recommendations and Plan:    Pager [phone]  After 5 pm/weekends or if no response :[phone]

## 2021-06-16 NOTE — DISCHARGE NOTE PROVIDER - NSDCCPTREATMENT_GEN_ALL_CORE_FT
PRINCIPAL PROCEDURE  Procedure: Implantation of leadless pacemaker  Findings and Treatment: Micra leadless PPM       PRINCIPAL PROCEDURE  Procedure: Implantation of leadless pacemaker  Findings and Treatment: Micra leadless PPM  WOUND CARE:   the DAY AFTER your procedure: remove the bandage at the site, GENTLY clean with mild soap and water, and pat dry, leave OPEN TO AIR  - you may shower  - DO NOT apply lotions, powders, ointments, or perfumes  - check groin every day.  A small amount of soarness and bruising is normal, small pump ( nickel size) is normal  - DO NOT SOAK site for 1 week ( no baths, no pools, no tubs, etc...)  ACTIVITY:  Your procedure was done through your groin  for the next 5 DAYS:  - Limit climbing stairs, no strenous activity, pushing , pulling, or straining   DO NOT LIFT anything 10 lbs or heavier   you may resume sexual activity in 7 days, unless instructed otherwise  Follow heart healthy diet reccomended by your doctor, , if you smoke STOP SMOKING ( may call [phone] for center of tobacco control if you need assistance).  for the next 24 hours:   - stay at home and rest, do not drive or operate heavy machinary   do not drink alcoholic beverages   do not make important personal or business decisions   ***CALL YOUR DOCTOR ***  IF you have fever, chills, body aches, or severe pain, swelling, redness, heat, yellow drainage from your incision site  IF bleeding  or significnat new swelling from your puncture site  IF your experience lightheadness, dizziness, or fainting spell.  IF unable to ge tin contact with yout doctor, you may call the Cardiology Office at [facility] at [phone]         PRINCIPAL PROCEDURE  Procedure: Implantation of leadless pacemaker  Findings and Treatment: Micra leadless PPM  WOUND CARE right femoral site  the DAY AFTER your procedure: remove the bandage at the site, GENTLY clean with mild soap and water, and pat dry, leave OPEN TO AIR  - you may shower  - DO NOT apply lotions, powders, ointments, or perfumes  - check groin every day.  A small amount of soarness and bruising is normal, small pump ( nickel size) is normal  - DO NOT SOAK site for 1 week ( no baths, no pools, no tubs, etc...)  ACTIVITY:  Your procedure was done through your groin  for the next 5 DAYS:  - Limit climbing stairs, no strenous activity, pushing , pulling, or straining   WOUND CARE of left anterior chest wall: wet to dry dressing placed prior to your discharge.  Keep site dry.  Wound care to apply wound VAC at your home after discharge  DO NOT LIFT anything 10 lbs or heavier   you may resume sexual activity in 7 days, unless instructed otherwise  Follow heart healthy diet reccomended by your doctor, , if you smoke STOP SMOKING ( may call [phone] for center of tobacco control if you need assistance).  for the next 24 hours:   - stay at home and rest, do not drive or operate heavy machinary   do not drink alcoholic beverages   do not make important personal or business decisions   ***CALL YOUR DOCTOR ***  IF you have fever, chills, body aches, or severe pain, swelling, redness, heat, yellow drainage from your incision site  IF bleeding  or significnat new swelling from your puncture site  IF your experience lightheadness, dizziness, or fainting spell.  IF unable to ge tin contact with yout doctor, you may call the Cardiology Office at [facility] at [phone]

## 2021-06-17 ENCOUNTER — TRANSCRIPTION ENCOUNTER (OUTPATIENT)
Age: 86
End: 2021-06-17

## 2021-06-17 VITALS
DIASTOLIC BLOOD PRESSURE: 60 MMHG | TEMPERATURE: 98 F | SYSTOLIC BLOOD PRESSURE: 106 MMHG | OXYGEN SATURATION: 98 % | HEART RATE: 80 BPM | RESPIRATION RATE: 18 BRPM

## 2021-06-17 LAB
ANION GAP SERPL CALC-SCNC: 12 MMOL/L — SIGNIFICANT CHANGE UP (ref 5–17)
BUN SERPL-MCNC: 26 MG/DL — HIGH (ref 7–23)
CALCIUM SERPL-MCNC: 8.8 MG/DL — SIGNIFICANT CHANGE UP (ref 8.4–10.5)
CHLORIDE SERPL-SCNC: 93 MMOL/L — LOW (ref 96–108)
CO2 SERPL-SCNC: 30 MMOL/L — SIGNIFICANT CHANGE UP (ref 22–31)
CREAT SERPL-MCNC: 1.69 MG/DL — HIGH (ref 0.5–1.3)
GLUCOSE SERPL-MCNC: 123 MG/DL — HIGH (ref 70–99)
POTASSIUM SERPL-MCNC: 3.6 MMOL/L — SIGNIFICANT CHANGE UP (ref 3.5–5.3)
POTASSIUM SERPL-SCNC: 3.6 MMOL/L — SIGNIFICANT CHANGE UP (ref 3.5–5.3)
SODIUM SERPL-SCNC: 135 MMOL/L — SIGNIFICANT CHANGE UP (ref 135–145)

## 2021-06-17 PROCEDURE — 99238 HOSP IP/OBS DSCHRG MGMT 30/<: CPT

## 2021-06-17 PROCEDURE — 87116 MYCOBACTERIA CULTURE: CPT

## 2021-06-17 PROCEDURE — 86901 BLOOD TYPING SEROLOGIC RH(D): CPT

## 2021-06-17 PROCEDURE — 80053 COMPREHEN METABOLIC PANEL: CPT

## 2021-06-17 PROCEDURE — 87102 FUNGUS ISOLATION CULTURE: CPT

## 2021-06-17 PROCEDURE — 80048 BASIC METABOLIC PNL TOTAL CA: CPT

## 2021-06-17 PROCEDURE — 87070 CULTURE OTHR SPECIMN AEROBIC: CPT

## 2021-06-17 PROCEDURE — 93005 ELECTROCARDIOGRAM TRACING: CPT

## 2021-06-17 PROCEDURE — 94640 AIRWAY INHALATION TREATMENT: CPT

## 2021-06-17 PROCEDURE — 86900 BLOOD TYPING SEROLOGIC ABO: CPT

## 2021-06-17 PROCEDURE — C1773: CPT

## 2021-06-17 PROCEDURE — 97161 PT EVAL LOW COMPLEX 20 MIN: CPT

## 2021-06-17 PROCEDURE — 76000 FLUOROSCOPY <1 HR PHYS/QHP: CPT

## 2021-06-17 PROCEDURE — C1894: CPT

## 2021-06-17 PROCEDURE — 71046 X-RAY EXAM CHEST 2 VIEWS: CPT

## 2021-06-17 PROCEDURE — C2629: CPT

## 2021-06-17 PROCEDURE — C1786: CPT

## 2021-06-17 PROCEDURE — 85027 COMPLETE CBC AUTOMATED: CPT

## 2021-06-17 PROCEDURE — 87015 SPECIMEN INFECT AGNT CONCNTJ: CPT

## 2021-06-17 PROCEDURE — 86850 RBC ANTIBODY SCREEN: CPT

## 2021-06-17 PROCEDURE — 87206 SMEAR FLUORESCENT/ACID STAI: CPT

## 2021-06-17 PROCEDURE — 87075 CULTR BACTERIA EXCEPT BLOOD: CPT

## 2021-06-17 PROCEDURE — 86923 COMPATIBILITY TEST ELECTRIC: CPT

## 2021-06-17 PROCEDURE — 97602 WOUND(S) CARE NON-SELECTIVE: CPT

## 2021-06-17 PROCEDURE — 85730 THROMBOPLASTIN TIME PARTIAL: CPT

## 2021-06-17 PROCEDURE — 99232 SBSQ HOSP IP/OBS MODERATE 35: CPT

## 2021-06-17 PROCEDURE — 85610 PROTHROMBIN TIME: CPT

## 2021-06-17 PROCEDURE — C1769: CPT

## 2021-06-17 RX ORDER — APIXABAN 2.5 MG/1
1 TABLET, FILM COATED ORAL
Qty: 0 | Refills: 0 | DISCHARGE

## 2021-06-17 RX ORDER — CEPHALEXIN 500 MG
1 CAPSULE ORAL
Qty: 0 | Refills: 0 | DISCHARGE

## 2021-06-17 RX ORDER — ACETAMINOPHEN 500 MG
1000 TABLET ORAL ONCE
Refills: 0 | Status: COMPLETED | OUTPATIENT
Start: 2021-06-17 | End: 2021-06-17

## 2021-06-17 RX ORDER — ACETAMINOPHEN 500 MG
2 TABLET ORAL
Qty: 0 | Refills: 0 | DISCHARGE
Start: 2021-06-17

## 2021-06-17 RX ORDER — CIPROFLOXACIN LACTATE 400MG/40ML
1 VIAL (ML) INTRAVENOUS
Qty: 58 | Refills: 0
Start: 2021-06-17 | End: 2021-07-15

## 2021-06-17 RX ADMIN — Medication 400 MILLIGRAM(S): at 09:19

## 2021-06-17 RX ADMIN — Medication 250 MILLIGRAM(S): at 05:14

## 2021-06-17 RX ADMIN — Medication 50 MICROGRAM(S): at 05:14

## 2021-06-17 RX ADMIN — OXYCODONE AND ACETAMINOPHEN 1 TABLET(S): 5; 325 TABLET ORAL at 13:09

## 2021-06-17 RX ADMIN — Medication 0.25 MILLIGRAM(S): at 05:14

## 2021-06-17 RX ADMIN — Medication 20 MILLIGRAM(S): at 05:14

## 2021-06-17 RX ADMIN — OXYCODONE AND ACETAMINOPHEN 1 TABLET(S): 5; 325 TABLET ORAL at 11:19

## 2021-06-17 RX ADMIN — SODIUM CHLORIDE 3 MILLILITER(S): 9 INJECTION INTRAMUSCULAR; INTRAVENOUS; SUBCUTANEOUS at 05:55

## 2021-06-17 RX ADMIN — ALBUTEROL 1 PUFF(S): 90 AEROSOL, METERED ORAL at 05:14

## 2021-06-17 NOTE — PHYSICAL THERAPY INITIAL EVALUATION ADULT - IMPAIRMENTS FOUND, PT EVAL
integumentary integrity gait, locomotion, and balance/gross motor/integumentary integrity/muscle strength

## 2021-06-17 NOTE — DISCHARGE NOTE NURSING/CASE MANAGEMENT/SOCIAL WORK - PATIENT PORTAL LINK FT
You can access the FollowMyHealth Patient Portal offered by [facility] by registering at the following website: [email or web address]. By joining [facility]’s FollowMyHealth portal, you will also be able to view your health information using other applications (apps) compatible with our system.

## 2021-06-17 NOTE — PHYSICAL THERAPY INITIAL EVALUATION ADULT - CRITERIA FOR SKILLED THERAPEUTIC INTERVENTIONS
impairments found impairments found/risk reduction/prevention/rehab potential/anticipated discharge recommendation

## 2021-06-17 NOTE — PHYSICAL THERAPY INITIAL EVALUATION ADULT - ADDITIONAL COMMENTS
Per pt, he lives with wife and has 24/7 HHA. Lives in house with no JONI and +stair lift inside to bedroom/bathroom. Reports requiring assist from HHA with all functional mobility and ADLs. Has stair lift, RW, WC, shower chair, and commode at home.

## 2021-06-17 NOTE — PHYSICAL THERAPY INITIAL EVALUATION ADULT - PLANNED THERAPY INTERVENTIONS, PT EVAL
Negative Pressure Wound Therapy Negative Pressure Wound Therapy/balance training/bed mobility training/gait training/strengthening/transfer training

## 2021-06-17 NOTE — PHYSICAL THERAPY INITIAL EVALUATION ADULT - PERTINENT HX OF CURRENT PROBLEM, REHAB EVAL
Pt with hx recurrent infection left PPM site despite revision/debridement. Adm for elective ppm/wire removal, debridement/pocket revision, and JOANA implant.

## 2021-06-20 LAB
CULTURE RESULTS: SIGNIFICANT CHANGE UP
SPECIMEN SOURCE: SIGNIFICANT CHANGE UP

## 2021-07-14 LAB
CULTURE RESULTS: SIGNIFICANT CHANGE UP
SPECIMEN SOURCE: SIGNIFICANT CHANGE UP

## 2021-08-04 LAB
CULTURE RESULTS: SIGNIFICANT CHANGE UP
SPECIMEN SOURCE: SIGNIFICANT CHANGE UP
